# Patient Record
Sex: FEMALE | Race: ASIAN | Employment: UNEMPLOYED | URBAN - METROPOLITAN AREA
[De-identification: names, ages, dates, MRNs, and addresses within clinical notes are randomized per-mention and may not be internally consistent; named-entity substitution may affect disease eponyms.]

---

## 2021-08-21 ENCOUNTER — HOSPITAL ENCOUNTER (OUTPATIENT)
Age: 36
Discharge: HOME | End: 2021-08-21
Payer: COMMERCIAL

## 2021-08-21 LAB
A/G RATIO: 1.5 (ref 1.1–2.2)
ALBUMIN SERPL-MCNC: 4.3 G/DL (ref 3.4–5)
ALP BLD-CCNC: 68 U/L (ref 40–129)
ALT SERPL-CCNC: 27 U/L (ref 10–40)
ANION GAP SERPL CALCULATED.3IONS-SCNC: 13 MMOL/L (ref 3–16)
AST SERPL-CCNC: 34 U/L (ref 15–37)
BILIRUB SERPL-MCNC: 0.3 MG/DL (ref 0–1)
BUN BLDV-MCNC: 8 MG/DL (ref 7–20)
CALCIUM SERPL-MCNC: 9.4 MG/DL (ref 8.3–10.6)
CHLORIDE BLD-SCNC: 100 MMOL/L (ref 99–110)
CHOLESTEROL, TOTAL: 150 MG/DL (ref 0–199)
CO2: 24 MMOL/L (ref 21–32)
CREAT SERPL-MCNC: 0.8 MG/DL (ref 0.6–1.1)
GFR AFRICAN AMERICAN: >60
GFR NON-AFRICAN AMERICAN: >60
GLOBULIN: 2.8 G/DL
GLUCOSE BLD-MCNC: 79 MG/DL (ref 70–99)
HDLC SERPL-MCNC: 49 MG/DL (ref 40–60)
LDL CHOLESTEROL CALCULATED: 90 MG/DL
POTASSIUM SERPL-SCNC: 4.8 MMOL/L (ref 3.5–5.1)
PROLACTIN: 8.6 NG/ML
SODIUM BLD-SCNC: 137 MMOL/L (ref 136–145)
TOTAL PROTEIN: 7.1 G/DL (ref 6.4–8.2)
TRIGL SERPL-MCNC: 53 MG/DL (ref 0–150)
TSH SERPL DL<=0.05 MIU/L-ACNC: 3.09 UIU/ML (ref 0.27–4.2)
VLDLC SERPL CALC-MCNC: 11 MG/DL

## 2021-08-21 PROCEDURE — 83525 ASSAY OF INSULIN: CPT

## 2021-08-21 PROCEDURE — 80053 COMPREHEN METABOLIC PANEL: CPT

## 2021-08-21 PROCEDURE — 80061 LIPID PANEL: CPT

## 2021-08-21 PROCEDURE — 36415 COLL VENOUS BLD VENIPUNCTURE: CPT

## 2021-08-21 PROCEDURE — 84443 ASSAY THYROID STIM HORMONE: CPT

## 2021-08-21 PROCEDURE — 83036 HEMOGLOBIN GLYCOSYLATED A1C: CPT

## 2021-08-21 PROCEDURE — 84146 ASSAY OF PROLACTIN: CPT

## 2021-08-22 LAB
ESTIMATED AVERAGE GLUCOSE: 108.3 MG/DL
HBA1C MFR BLD: 5.4 %

## 2021-08-24 LAB — INSULIN: 4 UIU/ML

## 2021-09-03 LAB — FOLLICLE STIMULATING HORMONE: 6.8 MIU/ML

## 2021-09-17 ENCOUNTER — HOSPITAL ENCOUNTER (OUTPATIENT)
Dept: GENERAL RADIOLOGY | Age: 36
Discharge: HOME | End: 2021-09-17
Payer: COMMERCIAL

## 2021-09-17 DIAGNOSIS — Z31.41 FERTILITY TESTING: ICD-10-CM

## 2021-09-17 PROCEDURE — 6360000004 HC RX CONTRAST MEDICATION

## 2021-09-17 PROCEDURE — 74740 X-RAY FEMALE GENITAL TRACT: CPT

## 2021-09-17 RX ADMIN — IOPAMIDOL 1 ML: 408 INJECTION, SOLUTION INTRATHECAL at 12:52

## 2021-11-06 ENCOUNTER — HOSPITAL ENCOUNTER (OUTPATIENT)
Age: 36
Discharge: HOME | End: 2021-11-06
Payer: COMMERCIAL

## 2021-11-06 LAB — TSH SERPL DL<=0.05 MIU/L-ACNC: 2.79 UIU/ML (ref 0.27–4.2)

## 2021-11-06 PROCEDURE — 84443 ASSAY THYROID STIM HORMONE: CPT

## 2021-11-06 PROCEDURE — 36415 COLL VENOUS BLD VENIPUNCTURE: CPT

## 2021-11-15 LAB
HPV COMMENT: NORMAL
HPV TYPE 16: NOT DETECTED
HPV TYPE 18: NOT DETECTED
HPVOH (OTHER TYPES): NOT DETECTED

## 2022-02-28 ENCOUNTER — OFFICE VISIT (OUTPATIENT)
Dept: PRIMARY CARE CLINIC | Age: 37
End: 2022-02-28
Payer: COMMERCIAL

## 2022-02-28 VITALS
SYSTOLIC BLOOD PRESSURE: 112 MMHG | BODY MASS INDEX: 23.86 KG/M2 | DIASTOLIC BLOOD PRESSURE: 74 MMHG | HEART RATE: 56 BPM | TEMPERATURE: 97.1 F | WEIGHT: 143.2 LBS | HEIGHT: 65 IN | OXYGEN SATURATION: 100 %

## 2022-02-28 DIAGNOSIS — Z00.00 ENCOUNTER FOR ANNUAL PHYSICAL EXAM: ICD-10-CM

## 2022-02-28 DIAGNOSIS — L65.9 HAIR LOSS: Primary | ICD-10-CM

## 2022-02-28 PROCEDURE — 99385 PREV VISIT NEW AGE 18-39: CPT | Performed by: STUDENT IN AN ORGANIZED HEALTH CARE EDUCATION/TRAINING PROGRAM

## 2022-02-28 RX ORDER — CHORIOGONADOTROPIN ALFA 250 UG/.5ML
INJECTION, SOLUTION SUBCUTANEOUS
COMMUNITY
Start: 2022-02-11

## 2022-02-28 RX ORDER — LETROZOLE 2.5 MG/1
TABLET, FILM COATED ORAL
COMMUNITY
Start: 2022-02-17

## 2022-02-28 SDOH — ECONOMIC STABILITY: FOOD INSECURITY: WITHIN THE PAST 12 MONTHS, YOU WORRIED THAT YOUR FOOD WOULD RUN OUT BEFORE YOU GOT MONEY TO BUY MORE.: NEVER TRUE

## 2022-02-28 SDOH — ECONOMIC STABILITY: FOOD INSECURITY: WITHIN THE PAST 12 MONTHS, THE FOOD YOU BOUGHT JUST DIDN'T LAST AND YOU DIDN'T HAVE MONEY TO GET MORE.: NEVER TRUE

## 2022-02-28 ASSESSMENT — PATIENT HEALTH QUESTIONNAIRE - PHQ9
SUM OF ALL RESPONSES TO PHQ QUESTIONS 1-9: 0
SUM OF ALL RESPONSES TO PHQ9 QUESTIONS 1 & 2: 0
SUM OF ALL RESPONSES TO PHQ QUESTIONS 1-9: 0
2. FEELING DOWN, DEPRESSED OR HOPELESS: 0
SUM OF ALL RESPONSES TO PHQ QUESTIONS 1-9: 0
SUM OF ALL RESPONSES TO PHQ QUESTIONS 1-9: 0
1. LITTLE INTEREST OR PLEASURE IN DOING THINGS: 0

## 2022-02-28 ASSESSMENT — SOCIAL DETERMINANTS OF HEALTH (SDOH): HOW HARD IS IT FOR YOU TO PAY FOR THE VERY BASICS LIKE FOOD, HOUSING, MEDICAL CARE, AND HEATING?: NOT HARD AT ALL

## 2022-02-28 NOTE — PROGRESS NOTES
2022    Vidhi Fowler (:  1985) is a 29 y.o. female, here for a preventive medicine evaluation. There is no problem list on file for this patient. On hormone therapy to try to get pregnant  Review of Systems   All other systems reviewed and are negative. Prior to Visit Medications    Medication Sig Taking? Authorizing Provider   OVIDREL 250 MCG/0.5ML INJ INJECT 1 PREFILLED SYRINGE SUBCUTANEOUSLY AS DIRECTED BY PHYSICIAN FOR A SINGLE DOSE Yes Historical Provider, MD   letrozole ([id number] [facility]) 2.5 MG tablet  Yes Historical Provider, MD        No Known Allergies    History reviewed. No pertinent past medical history. History reviewed. No pertinent surgical history. Social History     Socioeconomic History    Marital status:      Spouse name: Not on file    Number of children: Not on file    Years of education: Not on file    Highest education level: Not on file   Occupational History    Not on file   Tobacco Use    Smoking status: Never Smoker    Smokeless tobacco: Never Used   Vaping Use    Vaping Use: Never used   Substance and Sexual Activity    Alcohol use: Not Currently    Drug use: Not Currently    Sexual activity: Not on file   Other Topics Concern    Not on file   Social History Narrative    Not on file     Social Determinants of Health     Financial Resource Strain: Low Risk     Difficulty of Paying Living Expenses: Not hard at all   Food Insecurity: No Food Insecurity    Worried About [address] in the Last Year: Never true    [address]  in the Last Year: Never true   Transportation Needs:     Lack of Transportation (Medical): Not on file    Lack of Transportation (Non-Medical):  Not on file   Physical Activity:     Days of Exercise per Week: Not on file    Minutes of Exercise per Session: Not on file   Stress:     Feeling of Stress : Not on file   Social Connections:     Frequency of Communication with Friends and Family: Not on file    Frequency of Social Gatherings with Friends and Family: Not on file    Attends [employer] Services: Not on file    Active Member of Clubs or Organizations: Not on file    Attends Club or Organization Meetings: Not on file    Marital Status: Not on file   Intimate Partner Violence:     Fear of Current or Ex-Partner: Not on file    Emotionally Abused: Not on file    Physically Abused: Not on file    Sexually Abused: Not on file   Housing Stability:     Unable to Pay for Housing in the Last Year: Not on file    Number of [place] in the Last Year: Not on file    Unstable Housing in the Last Year: Not on file        History reviewed. No pertinent family history. ADVANCE DIRECTIVE: N, <no information>    Vitals:    02/28/22 1543   BP: 112/74   Site: Left Upper Arm   Position: Sitting   Cuff Size: Medium Adult   Pulse: 56   Temp: 97.1 °F (36.2 °C)   TempSrc: Infrared   SpO2: 100%   Weight: 143 lb 3.2 oz (65 kg)   Height: 5' 5\" (1.651 m)     Estimated body mass index is 23.83 kg/m² as calculated from the following:    Height as of this encounter: 5' 5\" (1.651 m). Weight as of this encounter: 143 lb 3.2 oz (65 kg). Physical Exam  Vitals reviewed. Constitutional:       General: She is not in acute distress. Appearance: Normal appearance. She is not ill-appearing. HENT:      Head: Normocephalic and atraumatic. Right Ear: Tympanic membrane, ear canal and external ear normal.      Left Ear: Tympanic membrane, ear canal and external ear normal.      Nose: Nose normal. No rhinorrhea. Mouth/Throat:      Mouth: Mucous membranes are moist.      Pharynx: Oropharynx is clear. No oropharyngeal exudate. Eyes:      General: No scleral icterus. Right eye: No discharge. Left eye: No discharge. Extraocular Movements: Extraocular movements intact. Conjunctiva/sclera: Conjunctivae normal.   Cardiovascular:      Rate and Rhythm: Normal rate and regular rhythm.       Pulses: Normal pulses. Heart sounds: Normal heart sounds. No murmur heard. No gallop. Pulmonary:      Effort: Pulmonary effort is normal.      Breath sounds: Normal breath sounds. No wheezing, rhonchi or rales. Abdominal:      General: Abdomen is flat. Bowel sounds are normal. There is no distension. Palpations: Abdomen is soft. There is no mass. Musculoskeletal:         General: Normal range of motion. Cervical back: Neck supple. No muscular tenderness. Lymphadenopathy:      Cervical: No cervical adenopathy. Skin:     General: Skin is warm. Capillary Refill: Capillary refill takes less than 2 seconds. Findings: No rash. Neurological:      General: No focal deficit present. Mental Status: She is alert. Cranial Nerves: No cranial nerve deficit. Psychiatric:         Mood and Affect: Mood normal.         Behavior: Behavior normal.         No flowsheet data found. Lab Results   Component Value Date    CHOL 150 08/21/2021    TRIG 53 08/21/2021    HDL 49 08/21/2021    LDLCALC 90 08/21/2021    GLUCOSE 79 08/21/2021    LABA1C 5.4 08/21/2021       The ASCVD Risk score (Elsworth Sis., et al., 2013) failed to calculate for the following reasons:     The 2013 ASCVD risk score is only valid for ages 36 to 78    Immunization History   Administered Date(s) Administered    COVID-19, Pfizer Purple top, DILUTE for use, 12+ yrs, 30mcg/0.3mL dose 04/17/2021, 05/08/2021, 12/16/2021       Health Maintenance   Topic Date Due    Hepatitis C screen  Never done    Varicella vaccine (1 of 2 - 2-dose childhood series) Never done    Depression Screen  Never done    HIV screen  Never done    DTaP/Tdap/Td vaccine (1 - Tdap) Never done    Flu vaccine (1) 09/01/2021    Cervical cancer screen  11/12/2026    COVID-19 Vaccine  Completed    Hepatitis A vaccine  Aged Out    Hepatitis B vaccine  Aged Out    Hib vaccine  Aged Out    Meningococcal (ACWY) vaccine  Aged Out    Pneumococcal 0-64 years Vaccine  Aged Out       Assessment & Plan   Hair loss  -     Basic Metabolic Panel; Future  -     TSH; Future  -     T4, Free; Future  -     Ferritin; Future  -     Iron and TIBC; Future  -     Vitamin B12 & Folate; Future  -     Hepatitis C Antibody; Future  -     HIV Screen; Future  Encounter for annual physical exam  -     Basic Metabolic Panel; Future  -     TSH; Future  -     T4, Free; Future  -     Ferritin; Future  -     Iron and TIBC; Future  -     Vitamin B12 & Folate; Future  -     Hepatitis C Antibody; Future  -     HIV Screen; Future    No follow-ups on file.          --Elton Murphy MD

## 2024-01-02 LAB
ABO/RH: NORMAL
ANTIBODY SCREEN: NORMAL
EXPIRATION DATE: NORMAL

## 2024-01-16 LAB
6-ACETYLMORPHINE, UR: NORMAL
ABO GROUPING: NORMAL
AMPHETAMINE SCREEN URINE: NORMAL
ANTIBODY SCREEN: NEGATIVE
BARBITURATE SCREEN URINE: NORMAL
BASOPHILS ABSOLUTE: 0 %
BASOPHILS ABSOLUTE: 0 THOU/MCL (ref 0–0.2)
BENZODIAZEPINE SCREEN, URINE: NORMAL
BUPRENORPHINE: NORMAL
CANNABINOID SCREEN URINE: NORMAL
CREATININE URINE: 46.9 MG/DL
EOSINOPHILS ABSOLUTE: 0.4 THOU/MCL (ref 0.03–0.45)
EOSINOPHILS RELATIVE PERCENT: 4 %
FENTANYL URINE: NORMAL
HCT VFR BLD CALC: 31.9 % (ref 36–46)
HEMOGLOBIN: 11.2 G/DL (ref 12–15.2)
HEPATITIS B SURF AG,XHBAGS: NONREACTIVE
HEPATITIS C VIRUS RNA SER/PLAS NCNC: NONREACTIVE
HIV 1+2 AB+HIV1P24 AG, EIA: NONREACTIVE
LYMPHOCYTES ABSOLUTE: 2 THOU/MCL (ref 1–4)
LYMPHOCYTES RELATIVE PERCENT: 23 %
Lab: NORMAL
MCH RBC QN AUTO: 31.1 PG (ref 27–33)
MCHC RBC AUTO-ENTMCNC: 35.1 G/DL (ref 32–36)
MCV RBC AUTO: 88.5 FL (ref 82–97)
METHADONE SCREEN, URINE: NORMAL
MONOCYTES # BLD: 4 %
MONOCYTES ABSOLUTE: 0.4 THOU/MCL (ref 0.2–0.9)
NEUTROPHILS ABSOLUTE: 6 THOU/MCL (ref 1.8–7.7)
OPIATE SCREEN URINE: NORMAL
OXYCODONE: NORMAL
PDW BLD-RTO: 13.2 % (ref 12.3–17)
PLATELET # BLD: 262 THOU/MCL (ref 140–375)
PMV BLD AUTO: 7.8 FL (ref 7.4–11.5)
RBC # BLD: 3.61 MIL/MCL (ref 3.8–5.2)
RUBELLA ANTIBODY IGG: 21.1 INDEX
SEG NEUTROPHILS: 69 %
TREPONEMA PALLIDUM ANTIBODIES: 0.04 INDEX VALUE
WBC # BLD: 8.7 THOU/MCL (ref 3.6–10.5)

## 2024-04-02 LAB
ABO GROUPING: NORMAL
ANTIBODY SCREEN: NEGATIVE
FERRITIN: 12 NG/ML (ref 30–306.8)
GLUCOSE CHALLENGE: 140 MG/DL (ref 70–129)
HCT VFR BLD CALC: 30 % (ref 36–46)
HEMOGLOBIN: 9.8 G/DL (ref 12–15.2)
IMMATURE RETIC FRACT: 0.55 (ref 0.29–0.53)
IRON % SATURATION: 12 % (ref 15–55)
IRON: 55 MCG/DL (ref 50–212)
MCH RBC QN AUTO: 29.8 PG (ref 27–33)
MCHC RBC AUTO-ENTMCNC: 32.8 G/DL (ref 32–36)
MCV RBC AUTO: 90.8 FL (ref 82–97)
MRV: 114.9 FL (ref 97.4–120.2)
PDW BLD-RTO: 13.3 % (ref 12.3–17)
PLATELET # BLD: 234 THOU/MCL (ref 140–375)
PMV BLD AUTO: 8.1 FL (ref 7.4–11.5)
RBC # BLD: 3.3 MIL/MCL (ref 3.8–5.2)
RETICULOCYTE ABSOLUTE COUNT: 2 %
RETICULOCYTE ABSOLUTE COUNT: 66.1 THOU/MCL (ref 20–105)
SPECIMEN EXPIRATION: NORMAL
T PALLIDUM AB: 0.05 INDEX VALUE
TOTAL IRON BINDING CAPACITY: 456 MCG/DL (ref 260–450)
TRANSFERRIN: 326 MG/DL (ref 203–362)
VITAMIN B-12: 219 PG/ML (ref 180–914)
WBC # BLD: 9.2 THOU/MCL (ref 3.6–10.5)

## 2024-04-10 LAB
GLUCOSE TOLERANCE TEST 1 HOUR: 94 MG/DL (ref 70–179)
GLUCOSE TOLERANCE TEST 2 HOUR: 78 MG/DL (ref 70–154)
GLUCOSE TOLERANCE TEST 3 HOUR: 82 MG/DL (ref 70–139)
GLUCOSE TOLERANCE TEST FASTING: 75 MG/DL (ref 70–94)